# Patient Record
(demographics unavailable — no encounter records)

---

## 2025-04-23 NOTE — REVIEW OF SYSTEMS
[Snoring] : snoring [Mouth Breathing] : mouth breathing [Cough] : cough [Negative] : Genitourinary [Ear Pain] : ear pain

## 2025-04-23 NOTE — DISCUSSION/SUMMARY
[FreeTextEntry1] : Assessment: 4-year-old male here for a follow up for mouth breathing and trouble sleeping. With recent urgent care visit for cough, on Azithromycin and Flonase, with ear pain. PE c/w AOM. Nasal congestion possibly seasonal allergies. Has seen ENT prior, had sleep study, discussed need for ENT follow-up.  Excessive cerumen in ears B/L Cerumen extraction performed in office. Mother consented to procedure. Wax was successfully extracted. No noted complications and no bleeding. Proper at home ear hygiene techniques reviewed. To avoid at home manipulation with Q-tips. Safe ear cleaning practices reviewed.  PLAN: ACUTE OTITIS MEDIA: - Diagnosis of AOM reviewed - High dose Amoxicillin script sent to pharmacy - Continue use Tylenol and Motrin PRN. Return precautions reviewed.  Cough:  there is no wheezing, clear to auscultation. - Complete Azithromycin course as per urgent care. - RVP swab obtained. - Loratadine as directed.  Chronic Nasal Congestion/ Snoring: - Follow up with Pediatric ENT, new referral given. - Flonase PRN. - Cobble stoning in posterior pharynx, trial Loratadine RX sent.  - Medication management reviewed.  STRICT return precautions given, reviewed when to seek immediate medical attention including but not limited to return of fevers, inability to tolerate fluids by mouth, decreased urination, rapid or labored breathing or any other concerning sign or symptom.  Return to clinic: 48 hours if any symptoms persist and 1 week for ear check. The plan above was discussed with the family. Caregiver verbalized understanding and agreement of the aforementioned plan above. All questions and concerns were addressed at this visit.

## 2025-04-23 NOTE — HISTORY OF PRESENT ILLNESS
[de-identified] : Trouble sleeping, mouth breathing and snoring  [FreeTextEntry6] :  5 yo male here for a follow up regarding a sleep study done early last year for persistent trouble sleeping and mouth breathing at night and in afternoon. Mother denies congestion, recent travel, and sick contacts. Mother states the mouth breathing during the night comes and goes and thinks it's been going on his "entire life".  Brant went to urgent care last week Saturday for cough and congestion- prescribed Azithromycin (5 ml by mouth day one, and then 2.5 ml by mouth day 2-4) and fluticasone (spray once twice a day for 10 days)  Coughing has gotten better but now ear pain. No fever. No vomiting. No diarrhea. No abdominal pain. No rash. Eating and drinking at baseline. Normal elimination. No known sick contacts. No known COVID exposures.

## 2025-04-23 NOTE — PHYSICAL EXAM
[Clear to Auscultation Bilaterally] : clear to auscultation bilaterally [NL] : warm, clear [Erythema] : erythema [Cobblestoning] : cobblestoning of posterior pharynx [FreeTextEntry3] : Cerumen in b/l TM (removed)

## 2025-07-16 NOTE — ASSESSMENT
[FreeTextEntry1] : Snoring, sleep disordered breathing, adenotonsillar hypertrophy, Nasal congestion.  Recommend trial of children's flonase, 1 spray to each nare daily.  Recommend repeat Pediatic sleep study given significant worsening of sleep disordered breathing by history.  Follow up in 3 months.     Total time spent on patient encounter including review of patient's medical history, physical examination, interpretation of any indicated labs / imaging and counseling, excluding time spent performing any indicated procedures: 37 minutes.     Rigoberto Upton MD, MPH Director of Pediatric Otolaryngology [facility] / [facility]

## 2025-07-16 NOTE — ASSESSMENT
[FreeTextEntry1] : Snoring, sleep disordered breathing, adenotonsillar hypertrophy, Nasal congestion.  Recommend trial of children's flonase, 1 spray to each nare daily.  Recommend repeat Pediatic sleep study given significant worsening of sleep disordered breathing by history.  Follow up in 3 months.     Total time spent on patient encounter including review of patient's medical history, physical examination, interpretation of any indicated labs / imaging and counseling, excluding time spent performing any indicated procedures: 37 minutes.     Rigoberto Upton MD, MPH Director of Pediatric Otolaryngology [facility] / [facility]

## 2025-07-16 NOTE — HISTORY OF PRESENT ILLNESS
[FreeTextEntry1] : Patient presents today with his parents c/o snoring, mouth breathing.  Patient mom states he snores and breathes through his mouth when he is sleeping.  Patient had a sleep study done 03/21/2023 - results reviewed and consistent with primary snoring without MARGE.  Parents endorse that his snoring has gotten worse since that time.  He will not snore loudly at baseline and he has  intermittent witnessed apneas and obstructive awakenings.

## 2025-07-16 NOTE — PHYSICAL EXAM
[de-identified] : mild edema [de-identified] : thin and clear [Midline] : trachea located in midline position [de-identified] : Size 3 [Normal] : palpation of lymph nodes is normal

## 2025-07-16 NOTE — PHYSICAL EXAM
[de-identified] : mild edema [de-identified] : thin and clear [Midline] : trachea located in midline position [de-identified] : Size 3 [Normal] : palpation of lymph nodes is normal